# Patient Record
Sex: FEMALE | Race: AMERICAN INDIAN OR ALASKA NATIVE | NOT HISPANIC OR LATINO | Employment: FULL TIME | URBAN - METROPOLITAN AREA
[De-identification: names, ages, dates, MRNs, and addresses within clinical notes are randomized per-mention and may not be internally consistent; named-entity substitution may affect disease eponyms.]

---

## 2017-01-16 ENCOUNTER — OFFICE VISIT (OUTPATIENT)
Dept: URGENT CARE | Facility: CLINIC | Age: 22
End: 2017-01-16
Payer: COMMERCIAL

## 2017-01-16 VITALS
DIASTOLIC BLOOD PRESSURE: 62 MMHG | HEART RATE: 70 BPM | SYSTOLIC BLOOD PRESSURE: 106 MMHG | TEMPERATURE: 98.7 F | RESPIRATION RATE: 12 BRPM | OXYGEN SATURATION: 97 %

## 2017-01-16 DIAGNOSIS — G43.809 OTHER MIGRAINE WITHOUT STATUS MIGRAINOSUS, NOT INTRACTABLE: ICD-10-CM

## 2017-01-16 PROCEDURE — 99214 OFFICE O/P EST MOD 30 MIN: CPT | Performed by: NURSE PRACTITIONER

## 2017-01-16 RX ORDER — BUTALBITAL, ACETAMINOPHEN AND CAFFEINE 50; 325; 40 MG/1; MG/1; MG/1
1 TABLET ORAL EVERY 4 HOURS PRN
Qty: 20 TAB | Refills: 0 | Status: SHIPPED | OUTPATIENT
Start: 2017-01-16 | End: 2017-05-19

## 2017-01-16 ASSESSMENT — ENCOUNTER SYMPTOMS
NAUSEA: 0
SINUS PRESSURE: 0
EYE PAIN: 0
VOMITING: 0
MYALGIAS: 0
VISUAL CHANGE: 0
EYE REDNESS: 0
BLURRED VISION: 0
CHILLS: 0
HEADACHES: 1
MIGRAINE HEADACHES: 1
PHOTOPHOBIA: 0
FOCAL WEAKNESS: 0
FEVER: 0
COUGH: 0
SORE THROAT: 0
LOSS OF BALANCE: 0
SPEECH CHANGE: 0
SHORTNESS OF BREATH: 0
SENSORY CHANGE: 0
DIZZINESS: 0
ABDOMINAL PAIN: 0

## 2017-01-16 NOTE — MR AVS SNAPSHOT
Emmett Holden   2017 1:00 PM   Office Visit   MRN: [id number]    Department:  [facility]   Dept Phone:  [phone]    Description:  Female : 1995   Provider:  SOURAV Hannon           Reason for Visit     Migraine 3 days      Allergies as of 2017     Allergen Noted Reactions    Other Environmental 2015       Latex , nuts     Peanut-Derived 2016   Anaphylaxis    almonds      You were diagnosed with     Other migraine without status migrainosus, not intractable   [[id number]]         Vital Signs     Blood Pressure Pulse Temperature Respirations Oxygen Saturation Smoking Status    106/62 mmHg 70 37.1 °C (98.7 °F) 12 97% Never Smoker       Basic Information     Date Of Birth Sex Race Ethnicity Preferred Language    1995 Female White Non- English      Problem List              ICD-10-CM Priority Class Noted - Resolved    Mixed headache R51   2015 - Present      Health Maintenance        Date Due Completion Dates    IMM HEP B VACCINE (1 of 3 - Primary Series) 1996 ---    IMM HEP A VACCINE (1 of 2 - Standard Series) 1997 ---    IMM VARICELLA (CHICKENPOX) VACCINE (1 of 2 - 2 Dose Adolescent Series) 2009 ---    IMM MENINGOCOCCAL VACCINE (MCV4) (1 of 1) 2012 ---    IMM DTaP/Tdap/Td Vaccine (1 - Tdap) 2015 ---    IMM INFLUENZA (1) 2016 ---            Current Immunizations     HPV Quadrivalent Vaccine (GARDASIL) 2010, 2010, 2010      Below and/or attached are the medications your provider expects you to take. Review all of your home medications and newly ordered medications with your provider and/or pharmacist. Follow medication instructions as directed by your provider and/or pharmacist. Please keep your medication list with you and share with your provider. Update the information when medications are discontinued, doses are changed, or new medications (including over-the-counter products) are added; and carry  medication information at all times in the event of emergency situations     Allergies:  OTHER ENVIRONMENTAL - (reactions not documented)     PEANUT-DERIVED - Anaphylaxis               Medications  Valid as of: January 16, 2017 -  1:36 PM    Generic Name Brand Name Tablet Size Instructions for use    Butalbital-APAP-Caffeine (Tab) FIORICET -40 MG Take 1 Tab by mouth every four hours as needed for Headache or Migraine.        Cyclobenzaprine HCl (Tab) FLEXERIL 5 MG Take 0.5-1 Tabs by mouth 3 times a day as needed.        Ibuprofen (Tab) MOTRIN 600 MG Take 1 Tab by mouth every 6 hours as needed.        Levonorgestrel-Ethinyl Estrad (Tab) AVIANE, ALESSE, LESSINA 0.1-20 MG-MCG TAKE 1 TAB BY MOUTH EVERY DAY.        .                 Medicines prescribed today were sent to:     [facility]Y #[id number] - [place], [place] - [address]    [address] [place] [address]    Phone: [phone] Fax: [phone]    Open 24 Hours?: No      Medication refill instructions:       If your prescription bottle indicates you have medication refills left, it is not necessary to call your provider’s office. Please contact your pharmacy and they will refill your medication.    If your prescription bottle indicates you do not have any refills left, you may request refills at any time through one of the following ways: The online ShowNearby system (except Urgent Care), by calling your provider’s office, or by asking your pharmacy to contact your provider’s office with a refill request. Medication refills are processed only during regular business hours and may not be available until the next business day. Your provider may request additional information or to have a follow-up visit with you prior to refilling your medication.   *Please Note: Medication refills are assigned a new Rx number when refilled electronically. Your pharmacy may indicate that no refills were authorized even though a new prescription for the same medication is  available at the pharmacy. Please request the medicine by name with the pharmacy before contacting your provider for a refill.        Referral     A referral request has been sent to our patient care coordination department. Please allow 3-5 business days for us to process this request and contact you either by phone or mail. If you do not hear from us by the 5th business day, please call us at [phone].           [employer] Access Code: [email or web address]  Expires: 2/12/2017 10:57 AM    [employer] kong[employer]ong technology  A secure, online tool to manage your health information     [employer] kongkong technology® is a secure, online tool that connects you to your personalized health information from the privacy of your home -- day or night - making it very easy for you to manage your healthcare. Once the activation process is completed, you can even access your medical information using the [employer] kongkong technology karsten, which is available for free in the Apple Karsten store or Google Play store.     [employer] kongkong technology provides the following levels of access (as shown below):   My Chart Features   [facility] Doctor [facility]  Specialists [facility]  Urgent  Care Non-[facility]  Primary Care  Doctor   Email your healthcare team securely and privately 24/7 X X X    Manage appointments: schedule your next appointment; view details of past/upcoming appointments X      Request prescription refills. X      View recent personal medical records, including lab and immunizations X X X X   View health record, including health history, allergies, medications X X X X   Read reports about your outpatient visits, procedures, consult and ER notes X X X X   See your discharge summary, which is a recap of your hospital and/or ER visit that includes your diagnosis, lab results, and care plan. X X       How to register for [employer] kongkong technology:  1. Go to  [email or web address].  2. Click on the Sign Up Now box, which takes you to the New Member Sign Up page. You will need to provide the following  information:  a. Enter your Network18 Access Code exactly as it appears at the top of this page. (You will not need to use this code after you’ve completed the sign-up process. If you do not sign up before the expiration date, you must request a new code.)   b. Enter your date of birth.   c. Enter your home email address.   d. Click Submit, and follow the next screen’s instructions.  3. Create a Network18 ID. This will be your Network18 login ID and cannot be changed, so think of one that is secure and easy to remember.  4. Create a Network18 password. You can change your password at any time.  5. Enter your Password Reset Question and Answer. This can be used at a later time if you forget your password.   6. Enter your e-mail address. This allows you to receive e-mail notifications when new information is available in Network18.  7. Click Sign Up. You can now view your health information.    For assistance activating your Network18 account, call [phone]

## 2017-01-16 NOTE — PROGRESS NOTES
Subjective:      Emmett Holden is a 21 y.o. female who presents with Migraine            HPI Comments: Medications, Allergies and Prior Medical Hx reviewed and updated in [facility].with patient/family today     Patient states that she was seen here previously for a oral tablet which helped her pain. She is currently out of that medication and is requesting more.    Migraine   This is a new problem. The current episode started in the past 7 days (5 days). The problem occurs intermittently. The problem has been waxing and waning. The pain is located in the bilateral and frontal region. The pain does not radiate. The quality of the pain is described as throbbing. The pain is at a severity of 5/10. Pertinent negatives include no abdominal pain, blurred vision, coughing, dizziness, ear pain, eye pain, eye redness, fever, loss of balance, nausea, phonophobia, photophobia, sinus pressure, sore throat, visual change or vomiting. Nothing aggravates the symptoms. She has tried NSAIDs for the symptoms. The treatment provided mild relief. Her past medical history is significant for migraine headaches. There is no history of recent head traumas or sinus disease.       Review of Systems   Constitutional: Negative for fever and chills.   HENT: Negative for congestion, ear pain, sinus pressure and sore throat.    Eyes: Negative for blurred vision, photophobia, pain and redness.   Respiratory: Negative for cough and shortness of breath.    Gastrointestinal: Negative for nausea, vomiting and abdominal pain.   Musculoskeletal: Negative for myalgias.   Skin: Negative for rash.   Neurological: Positive for headaches. Negative for dizziness, sensory change, speech change, focal weakness and loss of balance.          Objective:     /62 mmHg  Pulse 70  Temp(Src) 37.1 °C (98.7 °F)  Resp 12  SpO2 97%     Physical Exam   Constitutional: She is oriented to person, place, and time. She appears well-developed and well-nourished. No  distress.   HENT:   Head: Normocephalic and atraumatic.   Eyes: Conjunctivae are normal. Pupils are equal, round, and reactive to light.   Neck: Neck supple.   Cardiovascular: Normal rate, regular rhythm and normal heart sounds.    Pulmonary/Chest: Effort normal and breath sounds normal. No respiratory distress.   Neurological: She is alert and oriented to person, place, and time.    Awake, alert, answering questions appropriately, moving all extremeties Strong and equal, no facial droop, no arm drift. PERRL   Skin: Skin is warm and dry. No rash noted.   Psychiatric: She has a normal mood and affect. Her behavior is normal.               Assessment/Plan:       1. Other migraine without status migrainosus, not intractable  acetaminophen/caffeine/butalbital 325-40-50 mg (FIORICET) -40 MG Tab    REFERRAL TO NEUROLOGY     Review of the patient's previous visits only medication I could find was Fioricet. She was given a prescription for Fioricet and referral back to neurology for continued headaches.      Pt will go to the ER for worsening or changing symptoms as discussed,   Follow-up with Neurologist at the next available appt  Discharge instructions discussed with pt/family who verbalize understanding and agreement with poc

## 2017-03-07 ENCOUNTER — HOSPITAL ENCOUNTER (OUTPATIENT)
Dept: RADIOLOGY | Facility: MEDICAL CENTER | Age: 22
End: 2017-03-07
Attending: PSYCHIATRY & NEUROLOGY
Payer: COMMERCIAL

## 2017-03-07 DIAGNOSIS — M54.2 NECK PAIN: ICD-10-CM

## 2017-03-07 DIAGNOSIS — G44.89 ALLERGIC HEADACHE: ICD-10-CM

## 2017-03-07 DIAGNOSIS — M54.81 OCCIPITAL NEURALGIA, UNSPECIFIED LATERALITY: ICD-10-CM

## 2017-03-07 PROCEDURE — 70551 MRI BRAIN STEM W/O DYE: CPT

## 2017-05-11 ENCOUNTER — OFFICE VISIT (OUTPATIENT)
Dept: URGENT CARE | Facility: CLINIC | Age: 22
End: 2017-05-11
Payer: COMMERCIAL

## 2017-05-11 VITALS
HEIGHT: 60 IN | SYSTOLIC BLOOD PRESSURE: 90 MMHG | HEART RATE: 57 BPM | WEIGHT: 118 LBS | BODY MASS INDEX: 23.16 KG/M2 | TEMPERATURE: 98.2 F | DIASTOLIC BLOOD PRESSURE: 62 MMHG | OXYGEN SATURATION: 99 % | RESPIRATION RATE: 15 BRPM

## 2017-05-11 DIAGNOSIS — B07.0 PLANTAR WART OF RIGHT FOOT: ICD-10-CM

## 2017-05-11 PROCEDURE — 99213 OFFICE O/P EST LOW 20 MIN: CPT | Performed by: PHYSICIAN ASSISTANT

## 2017-05-11 ASSESSMENT — ENCOUNTER SYMPTOMS
NUMBNESS: 0
TINGLING: 0
FEVER: 0
SENSORY CHANGE: 0
CHILLS: 0

## 2017-05-11 NOTE — MR AVS SNAPSHOT
Emmett Holden   2017 8:45 AM   Office Visit   MRN: [id number]    Department:  [facility]   Dept Phone:  [phone]    Description:  Female : 1995   Provider:  Dimas Storey PA-C           Reason for Visit     Foot Problem wart on right foot, hurt to apply pressure, sensative, x3wks      Allergies as of 2017     Allergen Noted Reactions    Peanut-Derived 2016   Anaphylaxis, Hives, Swelling    almonds    Other Environmental 2015       Latex , nuts       You were diagnosed with     Plantar wart of right foot   [[id number]]         Vital Signs     Blood Pressure Pulse Temperature Respirations Height Weight    90/62 mmHg 57 36.8 °C (98.2 °F) 15 1.524 m (5') 53.524 kg (118 lb)    Body Mass Index Oxygen Saturation Smoking Status             23.05 kg/m2 99% Never Smoker          Basic Information     Date Of Birth Sex Race Ethnicity Preferred Language    1995 Female White Non- English      Problem List              ICD-10-CM Priority Class Noted - Resolved    Mixed headache R51   2015 - Present      Health Maintenance        Date Due Completion Dates    IMM HEP B VACCINE (1 of 3 - Primary Series) 1996 ---    IMM HEP A VACCINE (1 of 2 - Standard Series) 1997 ---    IMM VARICELLA (CHICKENPOX) VACCINE (1 of 2 - 2 Dose Adolescent Series) 2009 ---    IMM MENINGOCOCCAL VACCINE (MCV4) (1 of 1) 2012 ---    IMM DTaP/Tdap/Td Vaccine (1 - Tdap) 2015 ---            Current Immunizations     HPV Quadrivalent Vaccine (GARDASIL) 2010, 2010, 2010      Below and/or attached are the medications your provider expects you to take. Review all of your home medications and newly ordered medications with your provider and/or pharmacist. Follow medication instructions as directed by your provider and/or pharmacist. Please keep your medication list with you and share with your provider. Update the information when medications are discontinued,  doses are changed, or new medications (including over-the-counter products) are added; and carry medication information at all times in the event of emergency situations     Allergies:  PEANUT-DERIVED - Anaphylaxis,Hives,Swelling     OTHER ENVIRONMENTAL - (reactions not documented)               Medications  Valid as of: May 11, 2017 -  9:06 AM    Generic Name Brand Name Tablet Size Instructions for use    Butalbital-APAP-Caffeine (Tab) FIORICET -40 MG Take 1 Tab by mouth every four hours as needed for Headache or Migraine.        Cyclobenzaprine HCl (Tab) FLEXERIL 5 MG Take 0.5-1 Tabs by mouth 3 times a day as needed.        Ibuprofen (Tab) MOTRIN 600 MG Take 1 Tab by mouth every 6 hours as needed.        Levonorgestrel-Ethinyl Estrad (Tab) AVIANE, ALESSE, LESSINA 0.1-20 MG-MCG TAKE 1 TAB BY MOUTH EVERY DAY.        .                 Medicines prescribed today were sent to:     [facility]/PHARMACY #[id number] - [place], [place] - [address]    [address] [place] [address]    Phone: [phone] Fax: [phone]    Open 24 Hours?: No      Medication refill instructions:       If your prescription bottle indicates you have medication refills left, it is not necessary to call your provider’s office. Please contact your pharmacy and they will refill your medication.    If your prescription bottle indicates you do not have any refills left, you may request refills at any time through one of the following ways: The online Genasys system (except Urgent Care), by calling your provider’s office, or by asking your pharmacy to contact your provider’s office with a refill request. Medication refills are processed only during regular business hours and may not be available until the next business day. Your provider may request additional information or to have a follow-up visit with you prior to refilling your medication.   *Please Note: Medication refills are assigned a new Rx number when refilled electronically. Your  pharmacy may indicate that no refills were authorized even though a new prescription for the same medication is available at the pharmacy. Please request the medicine by name with the pharmacy before contacting your provider for a refill.        Referral     A referral request has been sent to our patient care coordination department. Please allow 3-5 business days for us to process this request and contact you either by phone or mail. If you do not hear from us by the 5th business day, please call us at [phone].           [employer] Access Code: Activation code not generated  Current [employer] Status: Active

## 2017-05-11 NOTE — PROGRESS NOTES
Subjective:      Emmett Holden is a 21 y.o. female who presents with Foot Problem            Foot Problem  This is a new problem. The current episode started 1 to 4 weeks ago. The problem occurs constantly. The problem has been gradually worsening. Pertinent negatives include no chills, fever or numbness. The symptoms are aggravated by walking and standing. She has tried nothing for the symptoms. The treatment provided no relief.   Possible plantar wart right foot. Becoming painful.      PMH:  has a past medical history of Headache; Allergy; Anxiety; and Migraine. She also has no past medical history of Addisons disease (CMS-[facility]), Anemia, Arrhythmia, Arthritis, Asthma, Blood transfusion without reported diagnosis, Cancer (CMS-HCC), Cataract, CHF (congestive heart failure) (CMS-HCC), Clotting disorder (CMS-HCC), COPD (chronic obstructive pulmonary disease) (CMS-HCC), Cushings syndrome (CMS-HCC), Depression, Diabetes (CMS-HCC), Diabetic neuropathy (CMS-HCC), Pulmonary emphysema (CMS-HCC), GERD (gastroesophageal reflux disease), Glaucoma, Goiter, Heart attack (CMS-HCC), Heart murmur, HIV (human immunodeficiency virus infection) (CMS-HCC), Hyperlipidemia, Hypertension, IBD (inflammatory bowel disease), Kidney disease, Meningitis, Muscle disorder, Osteoporosis, Parathyroid disorder (CMS-HCC), Pituitary disease (CMS-HCC), Seizure (CMS-HCC), Sickle cell disease (CMS-HCC), Stroke (CMS-HCC), Substance abuse, or Tuberculosis.  MEDS:   Current outpatient prescriptions:   •  levonorgestrel-ethinyl estradiol (AVIANE, ALESSE, LESSINA) 0.1-20 MG-MCG per tablet, TAKE 1 TAB BY MOUTH EVERY DAY., Disp: 28 Tab, Rfl: 9  •  acetaminophen/caffeine/butalbital 325-40-50 mg (FIORICET) -40 MG Tab, Take 1 Tab by mouth every four hours as needed for Headache or Migraine., Disp: 20 Tab, Rfl: 0  •  ibuprofen (MOTRIN) 600 MG Tab, Take 1 Tab by mouth every 6 hours as needed., Disp: 30 Tab, Rfl: 3  •  cyclobenzaprine (FLEXERIL) 5 MG  tablet, Take 0.5-1 Tabs by mouth 3 times a day as needed., Disp: 30 Tab, Rfl: 0  ALLERGIES:   Allergies   Allergen Reactions   • Peanut-Derived Anaphylaxis, Hives and Swelling     almonds   • Other Environmental      Latex , nuts      SURGHX:   Past Surgical History   Procedure Laterality Date   • Ureteral reimplantation       age 1-3     SOCHX:  reports that she has never smoked. She has never used smokeless tobacco. She reports that she does not drink alcohol or use illicit drugs.  FH: family history includes Asthma in her brother and father; Other in her mother.      Review of Systems   Constitutional: Negative for fever and chills.   Skin:        Possible wart on right plantar   Neurological: Negative for tingling, sensory change and numbness.       Medications, Allergies, and current problem list reviewed today in Epic     Objective:     BP 90/62 mmHg  Pulse 57  Temp(Src) 36.8 °C (98.2 °F)  Resp 15  Ht 1.524 m (5')  Wt 53.524 kg (118 lb)  BMI 23.05 kg/m2  SpO2 99%     Physical Exam   Constitutional: She is oriented to person, place, and time. She appears well-developed and well-nourished. No distress.   HENT:   Head: Normocephalic and atraumatic.   Eyes: Conjunctivae and EOM are normal.   Neck: Normal range of motion. Neck supple.   Cardiovascular: Normal rate, regular rhythm and normal heart sounds.    Pulmonary/Chest: Effort normal and breath sounds normal. No respiratory distress. She has no wheezes.   Neurological: She is alert and oriented to person, place, and time.   Skin: Skin is warm and dry. Lesion (flesh-colored raised, tender lesion on the right plantar surface. No signs of surrounding erythema or infection. No signs of foreign body. Consistent with a plantar wart.) noted. She is not diaphoretic.   Psychiatric: She has a normal mood and affect. Her behavior is normal. Judgment and thought content normal.   Nursing note and vitals reviewed.              Assessment/Plan:     1. Plantar wart of  right foot  REFERRAL TO DERMATOLOGY     No liquid nitrogen in clinic today. Suggested over-the-counter remedies  Referral to dermatology place for definitive management  Return to clinic or go to ED if symptoms worsen or persist. Indications for ED discussed at length. Patient voices understanding. Follow-up with your primary care provider in 3-5 days. Red flags discussed.    Please note that this dictation was created using voice recognition software. I have made every reasonable attempt to correct obvious errors, but I expect that there are errors of grammar and possibly content that I did not discover before finalizing the note.

## 2017-05-19 ENCOUNTER — HOSPITAL ENCOUNTER (EMERGENCY)
Facility: MEDICAL CENTER | Age: 22
End: 2017-05-19
Attending: EMERGENCY MEDICINE
Payer: COMMERCIAL

## 2017-05-19 VITALS
DIASTOLIC BLOOD PRESSURE: 66 MMHG | OXYGEN SATURATION: 100 % | TEMPERATURE: 98 F | RESPIRATION RATE: 18 BRPM | SYSTOLIC BLOOD PRESSURE: 118 MMHG | BODY MASS INDEX: 23.37 KG/M2 | WEIGHT: 119.05 LBS | HEART RATE: 64 BPM | HEIGHT: 60 IN

## 2017-05-19 DIAGNOSIS — R53.81 MALAISE AND FATIGUE: ICD-10-CM

## 2017-05-19 DIAGNOSIS — R53.83 MALAISE AND FATIGUE: ICD-10-CM

## 2017-05-19 DIAGNOSIS — N30.00 ACUTE CYSTITIS WITHOUT HEMATURIA: ICD-10-CM

## 2017-05-19 DIAGNOSIS — R11.2 NON-INTRACTABLE VOMITING WITH NAUSEA, UNSPECIFIED VOMITING TYPE: ICD-10-CM

## 2017-05-19 DIAGNOSIS — R55 VASOVAGAL SYNCOPE: ICD-10-CM

## 2017-05-19 DIAGNOSIS — R45.2 UNHAPPINESS: ICD-10-CM

## 2017-05-19 LAB
ANION GAP SERPL CALC-SCNC: 11 MMOL/L (ref 0–11.9)
APPEARANCE UR: ABNORMAL
BACTERIA #/AREA URNS HPF: ABNORMAL /HPF
BASOPHILS # BLD AUTO: 0.6 % (ref 0–1.8)
BASOPHILS # BLD: 0.06 K/UL (ref 0–0.12)
BILIRUB UR QL STRIP.AUTO: NEGATIVE
BUN SERPL-MCNC: 13 MG/DL (ref 8–22)
CALCIUM SERPL-MCNC: 9.6 MG/DL (ref 8.4–10.2)
CHLORIDE SERPL-SCNC: 106 MMOL/L (ref 96–112)
CO2 SERPL-SCNC: 19 MMOL/L (ref 20–33)
COLOR UR: YELLOW
CREAT SERPL-MCNC: 0.75 MG/DL (ref 0.5–1.4)
CULTURE IF INDICATED INDCX: YES UA CULTURE
EKG IMPRESSION: NORMAL
EOSINOPHIL # BLD AUTO: 0.2 K/UL (ref 0–0.51)
EOSINOPHIL NFR BLD: 2.1 % (ref 0–6.9)
EPI CELLS #/AREA URNS HPF: ABNORMAL /HPF
ERYTHROCYTE [DISTWIDTH] IN BLOOD BY AUTOMATED COUNT: 37.8 FL (ref 35.9–50)
GFR SERPL CREATININE-BSD FRML MDRD: >60 ML/MIN/1.73 M 2
GLUCOSE SERPL-MCNC: 100 MG/DL (ref 65–99)
GLUCOSE UR STRIP.AUTO-MCNC: NEGATIVE MG/DL
HCG UR QL: NEGATIVE
HCT VFR BLD AUTO: 42 % (ref 37–47)
HGB BLD-MCNC: 14.7 G/DL (ref 12–16)
IMM GRANULOCYTES # BLD AUTO: 0.02 K/UL (ref 0–0.11)
IMM GRANULOCYTES NFR BLD AUTO: 0.2 % (ref 0–0.9)
KETONES UR STRIP.AUTO-MCNC: NEGATIVE MG/DL
LEUKOCYTE ESTERASE UR QL STRIP.AUTO: ABNORMAL
LYMPHOCYTES # BLD AUTO: 2.23 K/UL (ref 1–4.8)
LYMPHOCYTES NFR BLD: 23.1 % (ref 22–41)
MCH RBC QN AUTO: 29.8 PG (ref 27–33)
MCHC RBC AUTO-ENTMCNC: 35 G/DL (ref 33.6–35)
MCV RBC AUTO: 85.2 FL (ref 81.4–97.8)
MICRO URNS: ABNORMAL
MONOCYTES # BLD AUTO: 0.47 K/UL (ref 0–0.85)
MONOCYTES NFR BLD AUTO: 4.9 % (ref 0–13.4)
MUCOUS THREADS #/AREA URNS HPF: ABNORMAL /HPF
NEUTROPHILS # BLD AUTO: 6.68 K/UL (ref 2–7.15)
NEUTROPHILS NFR BLD: 69.1 % (ref 44–72)
NITRITE UR QL STRIP.AUTO: NEGATIVE
NRBC # BLD AUTO: 0 K/UL
NRBC BLD AUTO-RTO: 0 /100 WBC
PH UR STRIP.AUTO: 6.5 [PH]
PLATELET # BLD AUTO: 189 K/UL (ref 164–446)
PMV BLD AUTO: 9.1 FL (ref 9–12.9)
POTASSIUM SERPL-SCNC: 3.9 MMOL/L (ref 3.6–5.5)
PROT UR QL STRIP: NEGATIVE MG/DL
RBC # BLD AUTO: 4.93 M/UL (ref 4.2–5.4)
RBC # URNS HPF: ABNORMAL /HPF
RBC UR QL AUTO: ABNORMAL
SODIUM SERPL-SCNC: 136 MMOL/L (ref 135–145)
SP GR UR REFRACTOMETRY: 1.01
SP GR UR STRIP.AUTO: 1.01
WBC # BLD AUTO: 9.7 K/UL (ref 4.8–10.8)
WBC #/AREA URNS HPF: ABNORMAL /HPF

## 2017-05-19 PROCEDURE — 81001 URINALYSIS AUTO W/SCOPE: CPT

## 2017-05-19 PROCEDURE — 87086 URINE CULTURE/COLONY COUNT: CPT

## 2017-05-19 PROCEDURE — 99283 EMERGENCY DEPT VISIT LOW MDM: CPT

## 2017-05-19 PROCEDURE — 85025 COMPLETE CBC W/AUTO DIFF WBC: CPT

## 2017-05-19 PROCEDURE — 93005 ELECTROCARDIOGRAM TRACING: CPT

## 2017-05-19 PROCEDURE — 81025 URINE PREGNANCY TEST: CPT

## 2017-05-19 PROCEDURE — A9270 NON-COVERED ITEM OR SERVICE: HCPCS | Performed by: EMERGENCY MEDICINE

## 2017-05-19 PROCEDURE — 700102 HCHG RX REV CODE 250 W/ 637 OVERRIDE(OP): Performed by: EMERGENCY MEDICINE

## 2017-05-19 PROCEDURE — 87186 SC STD MICRODIL/AGAR DIL: CPT

## 2017-05-19 PROCEDURE — 87077 CULTURE AEROBIC IDENTIFY: CPT

## 2017-05-19 PROCEDURE — 80048 BASIC METABOLIC PNL TOTAL CA: CPT

## 2017-05-19 PROCEDURE — 700111 HCHG RX REV CODE 636 W/ 250 OVERRIDE (IP): Performed by: EMERGENCY MEDICINE

## 2017-05-19 RX ORDER — SODIUM CHLORIDE 9 MG/ML
1000 INJECTION, SOLUTION INTRAVENOUS ONCE
Status: DISCONTINUED | OUTPATIENT
Start: 2017-05-19 | End: 2017-05-19 | Stop reason: HOSPADM

## 2017-05-19 RX ORDER — ONDANSETRON 2 MG/ML
4 INJECTION INTRAMUSCULAR; INTRAVENOUS ONCE
Status: DISCONTINUED | OUTPATIENT
Start: 2017-05-19 | End: 2017-05-19 | Stop reason: HOSPADM

## 2017-05-19 RX ORDER — ACETAMINOPHEN 325 MG/1
1000 TABLET ORAL ONCE
Status: COMPLETED | OUTPATIENT
Start: 2017-05-19 | End: 2017-05-19

## 2017-05-19 RX ORDER — ONDANSETRON 4 MG/1
4 TABLET, ORALLY DISINTEGRATING ORAL ONCE
Status: COMPLETED | OUTPATIENT
Start: 2017-05-19 | End: 2017-05-19

## 2017-05-19 RX ORDER — NITROFURANTOIN 25; 75 MG/1; MG/1
100 CAPSULE ORAL 2 TIMES DAILY
Qty: 6 CAP | Refills: 0 | Status: SHIPPED | OUTPATIENT
Start: 2017-05-19 | End: 2017-05-22

## 2017-05-19 RX ADMIN — ONDANSETRON 4 MG: 4 TABLET, ORALLY DISINTEGRATING ORAL at 16:14

## 2017-05-19 RX ADMIN — ACETAMINOPHEN 975 MG: 325 TABLET, FILM COATED ORAL at 17:04

## 2017-05-19 ASSESSMENT — PAIN SCALES - GENERAL: PAINLEVEL_OUTOF10: 8

## 2017-05-19 NOTE — ED PROVIDER NOTES
ED Provider Note    CHIEF COMPLAINT  Chief Complaint   Patient presents with   • Near Syncopal   • Blurred Vision       HPI  Emmett Holden is a 21 y.o. female who presents from her neurology office with a syncopal episode. The patient was getting occipital trigger point injections for chronic headache, she states immediately after the injection her vision became blurry and she fainted. This is a very brief episode of loss of consciousness, upon awakening there was some nausea with one episode of vomiting. Really she reports feeling some residual nausea and the symptoms of her chronic headache. She has no focal weakness or numbness or tingling. No fever. She is also concerned because recently she observed her mother having a blood draw and she also passed out at that time. No abdominal pain, no back pain, no chest pain. She denies possibility of pregnancy because she is on birth control pills. No other complaints the patient at this time.    REVIEW OF SYSTEMS  Negative for fever, rash, chest pain, dyspnea, abdominal pain, diarrhea, focal weakness, focal numbness, focal tingling, back pain. All other systems are negative.     PAST MEDICAL HISTORY  Past Medical History   Diagnosis Date   • Headache      chronic, daily, since middle school   • Allergy    • Anxiety    • Migraine        FAMILY HISTORY  Family History   Problem Relation Age of Onset   • Other Mother      Endometriosis   • Asthma Father    • Asthma Brother        SOCIAL HISTORY  Social History   Substance Use Topics   • Smoking status: Never Smoker    • Smokeless tobacco: Never Used   • Alcohol Use: No       SURGICAL HISTORY  Past Surgical History   Procedure Laterality Date   • Ureteral reimplantation       age 1-3       CURRENT MEDICATIONS  I personally reviewed the medication list in the charting documentation.     ALLERGIES  Allergies   Allergen Reactions   • Peanut-Derived Anaphylaxis, Hives and Swelling     almonds   • Other Environmental       Latex , nuts        MEDICAL RECORD  I have reviewed patient's medical record and pertinent results are listed above.      PHYSICAL EXAM  VITAL SIGNS: /66 mmHg  Pulse 64  Temp(Src) 36.7 °C (98 °F)  Resp 18  Ht 1.524 m (5')  Wt 54 kg (119 lb 0.8 oz)  BMI 23.25 kg/m2  SpO2 100%  LMP 05/19/2017   Constitutional: Well appearing patient in no acute distress.  Not toxic, nor ill in appearance.  HENT: Mucus membranes moist.    Eyes: No scleral icterus. Normal conjunctiva   Neck: Supple, comfortable, nonpainful range of motion.   Cardiovascular: Regular heart rate and rhythm.   Thorax & Lungs: Chest is nontender.  Lungs are clear to auscultation with good air movement bilaterally.  No wheeze, rhonchi, nor rales.   Abdomen: Soft, with no tenderness, rebound nor guarding.  No mass or pulsatile mass appreciated.  Skin: Warm, dry. No rash appreciated  Extremities/Musculoskeletal: No sign of trauma.   Neurologic: Alert & oriented. No focal deficits observed.   Psychiatric: Normal affect appropriate for the clinical situation.    DIAGNOSTIC STUDIES / PROCEDURES    EKG  12 Lead EKG interpreted by me to show:    Rate 64  Rhythm: Normal sinus rhythm  Axis: Normal  RI and QRS Intervals: Normal  T waves: No acute changes  ST segments: No acute changes  Ectopy: None.    My impression of this EKG: Does not indicate ischemia or arrythmia at this time.      LABS  Results for orders placed or performed during the hospital encounter of 05/19/17   URINALYSIS,CULTURE IF INDICATED   Result Value Ref Range    Micro Urine Req Microscopic     Color Yellow     Character Hazy (A)     Specific Gravity 1.015 <1.035    Ph 6.5 5.0-8.0    Glucose Negative Negative mg/dL    Ketones Negative Negative mg/dL    Protein Negative Negative mg/dL    Bilirubin Negative Negative    Nitrite Negative Negative    Leukocyte Esterase Small (A) Negative    Occult Blood Moderate (A) Negative    Culture Indicated Yes UA Culture   BETA-HCG QUALITATIVE URINE    Result Value Ref Range    Beta-Hcg Urine Negative Negative   REFRACTOMETER SG   Result Value Ref Range    Specific Gravity 1.015    BASIC METABOLIC PANEL   Result Value Ref Range    Sodium 136 135 - 145 mmol/L    Potassium 3.9 3.6 - 5.5 mmol/L    Chloride 106 96 - 112 mmol/L    Co2 19 (L) 20 - 33 mmol/L    Glucose 100 (H) 65 - 99 mg/dL    Bun 13 8 - 22 mg/dL    Creatinine 0.75 0.50 - 1.40 mg/dL    Calcium 9.6 8.4 - 10.2 mg/dL    Anion Gap 11.0 0.0 - 11.9   URINE MICROSCOPIC (W/UA)   Result Value Ref Range    WBC 5-10 (A) /hpf    RBC 2-5 (A) /hpf    Bacteria Moderate (A) None /hpf    Epithelial Cells Few Few /hpf    Mucous Threads Moderate /hpf   CBC WITH DIFFERENTIAL   Result Value Ref Range    WBC 9.7 4.8 - 10.8 K/uL    RBC 4.93 4.20 - 5.40 M/uL    Hemoglobin 14.7 12.0 - 16.0 g/dL    Hematocrit 42.0 37.0 - 47.0 %    MCV 85.2 81.4 - 97.8 fL    MCH 29.8 27.0 - 33.0 pg    MCHC 35.0 33.6 - 35.0 g/dL    RDW 37.8 35.9 - 50.0 fL    Platelet Count 189 164 - 446 K/uL    MPV 9.1 9.0 - 12.9 fL    Neutrophils-Polys 69.10 44.00 - 72.00 %    Lymphocytes 23.10 22.00 - 41.00 %    Monocytes 4.90 0.00 - 13.40 %    Eosinophils 2.10 0.00 - 6.90 %    Basophils 0.60 0.00 - 1.80 %    Immature Granulocytes 0.20 0.00 - 0.90 %    Nucleated RBC 0.00 /100 WBC    Neutrophils (Absolute) 6.68 2.00 - 7.15 K/uL    Lymphs (Absolute) 2.23 1.00 - 4.80 K/uL    Monos (Absolute) 0.47 0.00 - 0.85 K/uL    Eos (Absolute) 0.20 0.00 - 0.51 K/uL    Baso (Absolute) 0.06 0.00 - 0.12 K/uL    Immature Granulocytes (abs) 0.02 0.00 - 0.11 K/uL    NRBC (Absolute) 0.00 K/uL   ESTIMATED GFR   Result Value Ref Range    GFR If African American >60 >60 mL/min/1.73 m 2    GFR If Non African American >60 >60 mL/min/1.73 m 2   EKG (ER)   Result Value Ref Range    Report       [facility] Emergency Dept.    Test Date:  2017-05-19  Pt Name:    KUMAR DUNN                Department: EDS  MRN:        [id number]                      Room:  Gender:     F  "                           Technician: [id number]  :        1995                   Requested By:ER TRIAGE PROTOCOL  Order #:    [id number]                    Reading MD:    Measurements  Intervals                                Axis  Rate:       64                           P:          31  NJ:         134                          QRS:        71  QRSD:       83                           T:          15  QT:         378  QTc:        390    Interpretive Statements  Sinus rhythm  No previous ECG available for comparison           COURSE & MEDICAL DECISION MAKING  I have reviewed any medical record information, laboratory studies and radiographic results as noted above.      Encounter Summary: This is a 21 y.o. female with a presentation consistent with vasovagal syncope after occipital trigger point injections, also a similar episode recently after watching her mother have a blood draw. She feels somewhat nauseated and at this time tells me she is \"not feeling up for blood work.\" EKG is normal neuro exam is normal. We'll treat her with oral Zofran and will check urine for infection and pregnancy. With regard to her chronic headache she is under the care of a neurologist and I don't think here in the emergency department I can add any more to her care with regards to this headache. Additionally, I don't think further workup is needed for this vasovagal syncope. Once we have evaluated her urine, she'll be reevaluated to make sure she is feeling better ------ shortly after leaving the room I was called back in the room and evaluate the patient who was having word finding difficulties. At the bedside that she was not making sense for about a minute or so with the patient tells me she now feels normal. At this point, will check some blood work and then reevaluate her after she has her oral Zofran ------ her blood work is unremarkable but the urinalysis is consistent with a urinary tract infection. The patient for some " reason became pretty upset and decided to leave against medical device without talking to me. She was informed by the nursing staff that she has urinary tract infection although she then informed the nurse that she does not have a urinary tract infection despite what the urinalysis might show. So she was refusing her prescription for antibiotics. She left without speaking to me. I'm not really sure what happened here.       DISPOSITION: Eloped      FINAL IMPRESSION  1. Vasovagal syncope    2. Non-intractable vomiting with nausea, unspecified vomiting type    3. Malaise and fatigue    4. Unhappiness    5. Acute cystitis without hematuria           This dictation was created using voice recognition software. The accuracy of the dictation is limited to the abilities of the software. I expect there may be some errors of grammar and possibly content. The nursing notes were reviewed and certain aspects of this information were incorporated into this note.    Electronically signed by: Torey Castellano, 5/19/2017 3:48 PM

## 2017-05-19 NOTE — ED NOTES
The Medication Reconciliation process has been completed by interviewing the patient    Allergies have been reviewed  Antibiotic use in 30 days - NONE    Home Pharmacy:  [facility]

## 2017-05-20 NOTE — ED NOTES
"Before pt placed up for discharge. Pt walked out of room and stated she was leaving. Informed pt she has a uti and the dr was going to write for an abx. Pt states \"I dont have a uti and i dont need an abx.\" pt informed she needs to sign an AMA form. Form signed and on chart.  "

## 2017-05-20 NOTE — DISCHARGE INSTRUCTIONS
Return immediately to the Emergency Department if you experience continuing or worsening symptoms or if you develop any new or worsening symptoms including but not limited to fever, chest pain, difficulty breathing, any numbness/weakness/tingling, abdominal pain or any other concerning symptoms.      Syncope, commonly known as fainting, is a temporary loss of consciousness. It occurs when the blood flow to the brain is reduced. Vasovagal syncope (also called neurocardiogenic syncope) is a fainting spell in which the blood flow to the brain is reduced because of a sudden drop in heart rate and blood pressure. Vasovagal syncope occurs when the brain and the cardiovascular system (blood vessels) do not adequately communicate and respond to each other. This is the most common cause of fainting. It often occurs in response to fear or some other type of emotional or physical stress. The body has a reaction in which the heart starts beating too slowly or the blood vessels expand, reducing blood pressure. This type of fainting spell is generally considered harmless. However, injuries can occur if a person takes a sudden fall during a fainting spell.   CAUSES   Vasovagal syncope occurs when a person's blood pressure and heart rate decrease suddenly, usually in response to a trigger. Many things and situations can trigger an episode. Some of these include:   · Pain.    · Fear.    · The sight of blood or medical procedures, such as blood being drawn from a vein.    · Common activities, such as coughing, swallowing, stretching, or going to the bathroom.    · Emotional stress.    · Prolonged standing, especially in a warm environment.    · Lack of sleep or rest.    · Prolonged lack of food.    · Prolonged lack of fluids.    · Recent illness.  · The use of certain drugs that affect blood pressure, such as cocaine, alcohol, marijuana, inhalants, and opiates.    SYMPTOMS   Before the fainting episode, you may:   · Feel dizzy or  light headed.    · Become pale.  · Sense that you are going to faint.    · Feel like the room is spinning.    · Have tunnel vision, only seeing directly in front of you.    · Feel sick to your stomach (nauseous).    · See spots or slowly lose vision.    · Hear ringing in your ears.    · Have a headache.    · Feel warm and sweaty.    · Feel a sensation of pins and needles.  During the fainting spell, you will generally be unconscious for no longer than a couple minutes before waking up and returning to normal. If you get up too quickly before your body can recover, you may faint again. Some twitching or jerky movements may occur during the fainting spell.   DIAGNOSIS   Your health care provider will ask about your symptoms, take a medical history, and perform a physical exam. Various tests may be done to rule out other causes of fainting. These may include blood tests and tests to check the heart, such as electrocardiography, echocardiography, and possibly an electrophysiology study. When other causes have been ruled out, a test may be done to check the body's response to changes in position (tilt table test).  TREATMENT   Most cases of vasovagal syncope do not require treatment. Your health care provider may recommend ways to avoid fainting triggers and may provide home strategies for preventing fainting. If you must be exposed to a possible trigger, you can drink additional fluids to help reduce your chances of having an episode of vasovagal syncope. If you have warning signs of an oncoming episode, you can respond by positioning yourself favorably (lying down).  If your fainting spells continue, you may be given medicines to prevent fainting. Some medicines may help make you more resistant to repeated episodes of vasovagal syncope. Special exercises or compression stockings may be recommended. In rare cases, the surgical placement of a pacemaker is considered.  HOME CARE INSTRUCTIONS   · Learn to identify the  warning signs of vasovagal syncope.    · Sit or lie down at the first warning sign of a fainting spell. If sitting, put your head down between your legs. If you lie down, swing your legs up in the air to increase blood flow to the brain.    · Avoid hot tubs and saunas.  · Avoid prolonged standing.  · Drink enough fluids to keep your urine clear or pale yellow. Avoid caffeine.  · Increase salt in your diet as directed by your health care provider.    · If you have to stand for a long time, perform movements such as:    · Crossing your legs.    · Flexing and stretching your leg muscles.    · Squatting.    · Moving your legs.    · Bending over.    · Only take over-the-counter or prescription medicines as directed by your health care provider. Do not suddenly stop any medicines without asking your health care provider first.   SEEK MEDICAL CARE IF:   · Your fainting spells continue or happen more frequently in spite of treatment.    · You lose consciousness for more than a couple minutes.  · You have fainting spells during or after exercising or after being startled.    · You have new symptoms that occur with the fainting spells, such as:    · Shortness of breath.  · Chest pain.    · Irregular heartbeat.    · You have episodes of twitching or jerky movements that last longer than a few seconds.  · You have episodes of twitching or jerky movements without obvious fainting.  SEEK IMMEDIATE MEDICAL CARE IF:   · You have injuries or bleeding after a fainting spell.    · You have episodes of twitching or jerky movements that last longer than 5 minutes.    · You have more than one spell of twitching or jerky movements before returning to consciousness after fainting.     This information is not intended to replace advice given to you by your health care provider. Make sure you discuss any questions you have with your health care provider.     Document Released: 12/04/2013 Document Revised: 05/03/2016 Document Reviewed:  12/04/2013  [employer] Patient Education ©2016 [employer]        Urinary Tract Infection  Urinary tract infections (UTIs) can develop anywhere along your urinary tract. Your urinary tract is your body's drainage system for removing wastes and extra water. Your urinary tract includes two kidneys, two ureters, a bladder, and a urethra. Your kidneys are a pair of bean-shaped organs. Each kidney is about the size of your fist. They are located below your ribs, one on each side of your spine.  CAUSES  Infections are caused by microbes, which are microscopic organisms, including fungi, viruses, and bacteria. These organisms are so small that they can only be seen through a microscope. Bacteria are the microbes that most commonly cause UTIs.  SYMPTOMS   Symptoms of UTIs may vary by age and gender of the patient and by the location of the infection. Symptoms in young women typically include a frequent and intense urge to urinate and a painful, burning feeling in the bladder or urethra during urination. Older women and men are more likely to be tired, shaky, and weak and have muscle aches and abdominal pain. A fever may mean the infection is in your kidneys. Other symptoms of a kidney infection include pain in your back or sides below the ribs, nausea, and vomiting.  DIAGNOSIS  To diagnose a UTI, your caregiver will ask you about your symptoms. Your caregiver also will ask to provide a urine sample. The urine sample will be tested for bacteria and white blood cells. White blood cells are made by your body to help fight infection.  TREATMENT   Typically, UTIs can be treated with medication. Because most UTIs are caused by a bacterial infection, they usually can be treated with the use of antibiotics. The choice of antibiotic and length of treatment depend on your symptoms and the type of bacteria causing your infection.  HOME CARE INSTRUCTIONS  · If you were prescribed antibiotics, take them exactly as your  caregiver instructs you. Finish the medication even if you feel better after you have only taken some of the medication.  · Drink enough water and fluids to keep your urine clear or pale yellow.  · Avoid caffeine, tea, and carbonated beverages. They tend to irritate your bladder.  · Empty your bladder often. Avoid holding urine for long periods of time.  · Empty your bladder before and after sexual intercourse.  · After a bowel movement, women should cleanse from front to back. Use each tissue only once.  SEEK MEDICAL CARE IF:   · You have back pain.  · You develop a fever.  · Your symptoms do not begin to resolve within 3 days.  SEEK IMMEDIATE MEDICAL CARE IF:   · You have severe back pain or lower abdominal pain.  · You develop chills.  · You have nausea or vomiting.  · You have continued burning or discomfort with urination.  MAKE SURE YOU:   · Understand these instructions.  · Will watch your condition.  · Will get help right away if you are not doing well or get worse.     This information is not intended to replace advice given to you by your health care provider. Make sure you discuss any questions you have with your health care provider.     Document Released: 09/27/2006 Document Revised: 01/08/2016 Document Reviewed: 01/25/2013  [employer] Interactive Patient Education ©2016 [employer]

## 2017-05-21 LAB
BACTERIA UR CULT: ABNORMAL
BACTERIA UR CULT: ABNORMAL
SIGNIFICANT IND 70042: ABNORMAL
SITE SITE: ABNORMAL
SOURCE SOURCE: ABNORMAL

## 2017-05-24 NOTE — ED NOTES
ED Positive Culture Follow-up/Notification Note:    Date: 5/23/17     Patient seen in the ED on 5/19/2017 for syncopal episode after trigger point injections.   1. Vasovagal syncope    2. Non-intractable vomiting with nausea, unspecified vomiting type    3. Malaise and fatigue    4. Unhappiness    5. Acute cystitis without hematuria       Discharge Medication List as of 5/19/2017  5:46 PM      START taking these medications    Details   nitrofurantoin monohydr macro (MACROBID) 100 MG Cap Take 1 Cap by mouth 2 times a day for 3 days., Disp-6 Cap, R-0, Normal             Allergies: Peanut-derived and Other environmental     Final cultures:   Results     Procedure Component Value Units Date/Time    URINE CULTURE(NEW) [[id number]]  (Abnormal)  (Susceptibility) Collected:  05/19/17 1501    Order Status:  Completed Specimen Information:  Urine Updated:  05/21/17 1735     Significant Indicator POS (POS)      Source UR      Site --      Urine Culture Mixed skin fish <10,000 cfu/mL (A)      Urine Culture -- (A)      Result:        Enterococcus faecalis  >100,000 cfu/mL      Narrative:      TEST Urine Culture WAS CANCELLED, 05/19/17 19:46 HIS# [id number]    Culture & Susceptibility     ENTEROCOCCUS FAECALIS     Antibiotic Sensitivity Microscan Unit Status    Ampicillin Sensitive <=2 mcg/mL Final    Daptomycin Sensitive <=0.5 mcg/mL Final    Nitrofurantoin Sensitive <=32 mcg/mL Final    Penicillin Sensitive 2 mcg/mL Final    Tetracycline Resistant >8 mcg/mL Final                       URINALYSIS,CULTURE IF INDICATED [[id number]]  (Abnormal) Collected:  05/19/17 1501    Order Status:  Completed Specimen Information:  Other Updated:  05/19/17 1642     Micro Urine Req Microscopic      Color Yellow      Character Hazy (A)      Specific Gravity 1.015      Ph 6.5      Glucose Negative mg/dL      Ketones Negative mg/dL      Protein Negative mg/dL      Bilirubin Negative      Nitrite Negative      Leukocyte Esterase Small (A)       Occult Blood Moderate (A)      Culture Indicated Yes UA Culture     URINE CULTURE(NEW) [[id number]]     Order Status:  Canceled           Plan:   Appropriate antibiotic therapy prescribed. No changes required based upon culture result.      Rosa Najera

## 2021-06-25 PROBLEM — Z28.21 COVID-19 VACCINE DOSE DECLINED: Status: ACTIVE | Noted: 2021-06-25

## 2023-03-17 PROBLEM — N20.0 NEPHROLITHIASIS: Status: ACTIVE | Noted: 2023-03-17

## 2023-03-17 PROBLEM — N13.30 HYDROURETERONEPHROSIS: Status: ACTIVE | Noted: 2023-03-17
